# Patient Record
(demographics unavailable — no encounter records)

---

## 2017-01-05 NOTE — PD
HPI


Chief Complaint:  Injury


Time Seen by Provider:  08:31


Travel History


International Travel<30 days:  No


Contact w/Intl Traveler<30days:  No


Traveled to known affect area:  No





History of Present Illness


HPI


This 9-year-old male is complaining of pain in his right fifth finger.  He was 

hit in the finger with a football yesterday.  He had immediate pain and has had 

trouble using the finger since.  He denies other injury.  He is healthy on no 

medications





PFSH


Past Medical History


Asthma:  Yes


Developmental Delay:  No


Diminished Hearing:  No


Respiratory:  Yes (asthma)


Immunizations Current:  Yes


Pregnant?:  Not Pregnant





Past Surgical History


Ear Surgery:  Yes (BILATERAL TUBES APRIL2016)


Tonsillectomy:  Yes





Social History


Alcohol Use:  No


Tobacco Use:  No


Substance Use:  No





Allergies-Medications


(Allergen,Severity, Reaction):  


Coded Allergies:  


     No Known Allergies (Unverified , 1/5/17)


Reported Meds & Prescriptions





Reported Meds & Active Scripts


Active


No Active Prescriptions or Reported Medications    








Review of Systems


General / Constitutional:  No: Fever, Chills


Eyes:  No: Diploplia


HENT:  No: Headaches


Cardiovascular:  No: Chest Pain or Discomfort


Respiratory:  No: Cough, Shortness of Breath


Gastrointestinal:  No: Vomiting


Musculoskeletal:  Positive: Myalgias, Limited ROM


Skin:  No Rash


Hematologic/Lymphatic:  No: Easy Bruising





Physical Exam


Narrative


GENERAL: Well-developed male


SKIN: Warm and dry.


HEAD: Atraumatic. Normocephalic. 


EYES: Pupils equal and round. No scleral icterus. No injection or drainage. 


ENT: No nasal bleeding or discharge.  Mucous membranes pink and moist.


NECK: Trachea midline. No JVD. 


MUSCULOSKELETAL: No obvious deformities. No clubbing.  No cyanosis.  No edema.  

Site of pain is the right fifth finger.  The metacarpal is nontender.  There is 

swelling overlying the proximal phalanx.  The skin is intact.


NEUROLOGICAL: Awake and alert. No obvious cranial nerve deficits.  Motor 

grossly within normal limits. Normal speech.


PSYCHIATRIC: Appropriate mood and affect; insight and judgment normal.





Data


Data


Last Documented VS





Vital Signs








  Date Time  Temp Pulse Resp B/P Pulse Ox O2 Delivery O2 Flow Rate FiO2


 


1/5/17 08:26 99.1 74 18 123/72 98   








Orders





 Finger ([id number]) (1/5/17 08:34)








MDM


Medical Decision Making


Medical Screen Exam Complete:  Yes


Emergency Medical Condition:  Yes


Medical Record Reviewed:  Yes


Differential Diagnosis


Differential includes fracture, contusion, soft tissue injury


Narrative Course


X-ray of the finger was obtained.  I do not see a fracture.  Finger will be 

splinted.  Impression is contusion.  This could represent a growth plate injury 

or soft tissue injury.  He'll be splinted with recommendations that he be 

rechecked in 2-3 days for continued splinting or splint removal





Diagnosis





 Primary Impression:  


 Contusion of finger of right hand


 Qualified Code:  S60.051A - Contusion of right little finger without damage to 

nail, initial encounter





***Additional Instructions:


Elevate, ice for 24 hours, follow-up with your own medical doctor


Scripts


No Active Prescriptions or Reported Meds


Disposition:  01 DISCHARGE HOME


Condition:  Stable








Tadeo Rich MD Jan 5, 2017 08:54

## 2017-01-05 NOTE — RADHPO
EXAM DATE/TIME:  01/05/2017 08:42 

 

[facility] COMPARISON:     

No previous studies available for comparison.

 

                     

INDICATIONS :     

Pain right 5th digit, after getting hit while playing football yesterday.

                     

 

MEDICAL HISTORY :     

None.          

 

SURGICAL HISTORY :     

None.   

 

ENCOUNTER:     

Initial                                        

 

ACUITY:     

2 days      

 

PAIN SCORE:     

10/10

 

LOCATION:     

Right  5th digit

 

FINDINGS:     

Examination reveals a hairline Salter II fracture involving the medial metaphyseal region of the prox
imal fifth finger proximal phalanx. There appears be very minimal cortical angulation without displac
ement. The metacarpal phalangeal articulation is intact. Mild soft tissue swelling. The remainder of 
the finger is intact.

 

CONCLUSION:     

Hairline Salter II fracture as above

 

 

 

 Carlos Brunson MD on January 05, 2017 at 9:19           

Board Certified Radiologist.

 This report was verified electronically.